# Patient Record
Sex: MALE | Race: WHITE | NOT HISPANIC OR LATINO | Employment: FULL TIME | URBAN - METROPOLITAN AREA
[De-identification: names, ages, dates, MRNs, and addresses within clinical notes are randomized per-mention and may not be internally consistent; named-entity substitution may affect disease eponyms.]

---

## 2021-03-10 ENCOUNTER — OFFICE VISIT (OUTPATIENT)
Dept: FAMILY MEDICINE CLINIC | Facility: CLINIC | Age: 36
End: 2021-03-10

## 2021-03-10 VITALS
WEIGHT: 217 LBS | TEMPERATURE: 98 F | OXYGEN SATURATION: 97 % | HEART RATE: 87 BPM | DIASTOLIC BLOOD PRESSURE: 86 MMHG | HEIGHT: 66 IN | BODY MASS INDEX: 34.87 KG/M2 | SYSTOLIC BLOOD PRESSURE: 136 MMHG

## 2021-03-10 DIAGNOSIS — Z00.00 PREVENTATIVE HEALTH CARE: ICD-10-CM

## 2021-03-10 DIAGNOSIS — R20.0 ARM NUMBNESS: Primary | ICD-10-CM

## 2021-03-10 DIAGNOSIS — M54.2 NECK PAIN: ICD-10-CM

## 2021-03-10 DIAGNOSIS — R25.1 TREMOR: ICD-10-CM

## 2021-03-10 DIAGNOSIS — G56.03 CARPAL TUNNEL SYNDROME, BILATERAL UPPER LIMBS: ICD-10-CM

## 2021-03-10 PROBLEM — E66.09 CLASS 1 OBESITY DUE TO EXCESS CALORIES WITHOUT SERIOUS COMORBIDITY WITH BODY MASS INDEX (BMI) OF 34.0 TO 34.9 IN ADULT: Status: ACTIVE | Noted: 2021-03-10

## 2021-03-10 PROCEDURE — 99204 OFFICE O/P NEW MOD 45 MIN: CPT | Performed by: NURSE PRACTITIONER

## 2021-03-10 RX ORDER — TRAZODONE HYDROCHLORIDE 50 MG/1
TABLET ORAL
Qty: 14 TABLET | Refills: 0 | Status: SHIPPED | OUTPATIENT
Start: 2021-03-10

## 2021-03-10 NOTE — PROGRESS NOTES
"    Jimbo Cornell is a 35 y.o. male.     35-year-old white male with no known medical history who comes in today to be established as a new patient  Blood pressure 136/84 heart rate 86 he denies any chest pain, dyspnea, tachycardia or dizziness  Patient states he has trouble sleeping he only sleeps 2 to 3 hours at night wakes up a lot void frequently.  He does drink a lot of fluids before he goes to bed he is going to cut back on that I gave him a trial of trazodone to try at night   Patient also has a right hand intention tremor he states started about 6 months ago but he barely notices it while other people do.  He has no family history of Parkinson's or tremors that he is aware of.  He does have neck pain as well and complains of hands and part of his arms tingling going numb at night which could also be carpal tunnel.  I am doing a cervical x-ray to make sure being that he is  he has no issues going on his cervical spine.  I recommended he buy cock-up splints to wear at least at night.  May need a referral to hand specialist in the future weight is 217 but he has about 5 to 6 pounds of equipment and uniforms on.  He looks very fit and muscular and works out daily     trazodone 50 mg 1/2-2 tabs 30 minutes before bedtime  Cock-up splint schedule fasting blood work  Report worsening of tremor             The following portions of the patient's history were reviewed and updated as appropriate: allergies, current medications, past family history, past medical history, past social history, past surgical history and problem list.    Vitals:    03/10/21 1258   BP: 136/86   BP Location: Right arm   Patient Position: Sitting   Cuff Size: Large Adult   Pulse: 87   Temp: 98 °F (36.7 °C)   TempSrc: Temporal   SpO2: 97%   Weight: 98.4 kg (217 lb)   Height: 167.6 cm (66\")     Body mass index is 35.02 kg/m².    History reviewed. No pertinent past medical history.  Past Surgical History:   Procedure Laterality Date "   • SKIN GRAFT       Family History   Problem Relation Age of Onset   • No Known Problems Mother    • No Known Problems Father        There is no immunization history on file for this patient.    No results found for any previous visit.         Review of Systems   Constitutional: Negative.    HENT: Negative.    Respiratory: Negative.    Cardiovascular: Negative.    Genitourinary: Negative.    Musculoskeletal: Positive for neck pain.        Numbness bilateral hands   Neurological: Positive for tremors.       Objective   Physical Exam  Constitutional:       Appearance: Normal appearance.   HENT:      Head: Normocephalic.   Cardiovascular:      Rate and Rhythm: Normal rate and regular rhythm.      Pulses: Normal pulses.      Heart sounds: Normal heart sounds.   Pulmonary:      Effort: Pulmonary effort is normal.      Breath sounds: Normal breath sounds.   Abdominal:      General: Bowel sounds are normal.   Musculoskeletal:         General: Normal range of motion.      Cervical back: Normal range of motion.   Skin:     General: Skin is warm and dry.   Neurological:      General: No focal deficit present.      Mental Status: He is alert and oriented to person, place, and time.   Psychiatric:         Mood and Affect: Mood normal.         Behavior: Behavior normal.         Procedures    Assessment/Plan   Diagnoses and all orders for this visit:    1. Arm numbness (Primary)  -     XR Spine Cervical 2 or 3 View    2. Preventative health care  -     CBC & Differential; Future  -     Comprehensive Metabolic Panel; Future  -     Lipid Panel With LDL / HDL Ratio; Future    3. Neck pain    4. Tremor    5. Carpal tunnel syndrome, bilateral upper limbs    Other orders  -     traZODone (DESYREL) 50 MG tablet; 1/2 - 2 tabs 30 minutes before bedtime  Dispense: 14 tablet; Refill: 0          Current Outpatient Medications:   •  traZODone (DESYREL) 50 MG tablet, 1/2 - 2 tabs 30 minutes before bedtime, Disp: 14 tablet, Rfl: 0

## 2021-03-10 NOTE — PATIENT INSTRUCTIONS
Take trazodone as directed for insomnia  Cock-up splint at night and during the day if possible  Report worsening of right hand tremor  Possible referral to hand specialist

## 2021-03-11 DIAGNOSIS — Z00.00 PREVENTATIVE HEALTH CARE: ICD-10-CM

## 2021-03-11 DIAGNOSIS — R53.82 CHRONIC FATIGUE: Primary | ICD-10-CM

## 2021-03-17 LAB
ALBUMIN SERPL-MCNC: 4.6 G/DL (ref 4–5)
ALBUMIN/GLOB SERPL: 1.6 {RATIO} (ref 1.2–2.2)
ALP SERPL-CCNC: 74 IU/L (ref 39–117)
ALT SERPL-CCNC: 53 IU/L (ref 0–44)
AST SERPL-CCNC: 39 IU/L (ref 0–40)
BASOPHILS # BLD AUTO: 0.1 X10E3/UL (ref 0–0.2)
BASOPHILS NFR BLD AUTO: 1 %
BILIRUB SERPL-MCNC: 0.7 MG/DL (ref 0–1.2)
BUN SERPL-MCNC: 16 MG/DL (ref 6–20)
BUN/CREAT SERPL: 14 (ref 9–20)
CALCIUM SERPL-MCNC: 10.1 MG/DL (ref 8.7–10.2)
CHLORIDE SERPL-SCNC: 99 MMOL/L (ref 96–106)
CHOLEST SERPL-MCNC: 186 MG/DL (ref 100–199)
CO2 SERPL-SCNC: 22 MMOL/L (ref 20–29)
CREAT SERPL-MCNC: 1.12 MG/DL (ref 0.76–1.27)
EOSINOPHIL # BLD AUTO: 0.1 X10E3/UL (ref 0–0.4)
EOSINOPHIL NFR BLD AUTO: 2 %
ERYTHROCYTE [DISTWIDTH] IN BLOOD BY AUTOMATED COUNT: 15.2 % (ref 11.6–15.4)
GLOBULIN SER CALC-MCNC: 2.8 G/DL (ref 1.5–4.5)
GLUCOSE SERPL-MCNC: 86 MG/DL (ref 65–99)
HCT VFR BLD AUTO: 57.6 % (ref 37.5–51)
HDLC SERPL-MCNC: 41 MG/DL
HGB BLD-MCNC: 19.4 G/DL (ref 13–17.7)
IMM GRANULOCYTES # BLD AUTO: 0 X10E3/UL (ref 0–0.1)
IMM GRANULOCYTES NFR BLD AUTO: 0 %
LDLC SERPL CALC-MCNC: 125 MG/DL (ref 0–99)
LDLC/HDLC SERPL: 3 RATIO (ref 0–3.6)
LYMPHOCYTES # BLD AUTO: 1.5 X10E3/UL (ref 0.7–3.1)
LYMPHOCYTES NFR BLD AUTO: 17 %
MCH RBC QN AUTO: 29.5 PG (ref 26.6–33)
MCHC RBC AUTO-ENTMCNC: 33.7 G/DL (ref 31.5–35.7)
MCV RBC AUTO: 88 FL (ref 79–97)
MONOCYTES # BLD AUTO: 0.9 X10E3/UL (ref 0.1–0.9)
MONOCYTES NFR BLD AUTO: 10 %
NEUTROPHILS # BLD AUTO: 6.3 X10E3/UL (ref 1.4–7)
NEUTROPHILS NFR BLD AUTO: 70 %
PLATELET # BLD AUTO: 224 X10E3/UL (ref 150–450)
POTASSIUM SERPL-SCNC: 4.6 MMOL/L (ref 3.5–5.2)
PROT SERPL-MCNC: 7.4 G/DL (ref 6–8.5)
RBC # BLD AUTO: 6.58 X10E6/UL (ref 4.14–5.8)
SODIUM SERPL-SCNC: 139 MMOL/L (ref 134–144)
TESTOST FREE SERPL-MCNC: >50 PG/ML (ref 8.7–25.1)
TESTOST SERPL-MCNC: 2372.8 NG/DL (ref 264–916)
TRIGL SERPL-MCNC: 108 MG/DL (ref 0–149)
VLDLC SERPL CALC-MCNC: 20 MG/DL (ref 5–40)
WBC # BLD AUTO: 9 X10E3/UL (ref 3.4–10.8)